# Patient Record
Sex: FEMALE | Race: AMERICAN INDIAN OR ALASKA NATIVE
[De-identification: names, ages, dates, MRNs, and addresses within clinical notes are randomized per-mention and may not be internally consistent; named-entity substitution may affect disease eponyms.]

---

## 2019-01-15 ENCOUNTER — HOSPITAL ENCOUNTER (EMERGENCY)
Dept: HOSPITAL 42 - ED | Age: 44
Discharge: HOME | End: 2019-01-15
Payer: COMMERCIAL

## 2019-01-15 VITALS — BODY MASS INDEX: 38 KG/M2

## 2019-01-15 VITALS
OXYGEN SATURATION: 100 % | RESPIRATION RATE: 18 BRPM | DIASTOLIC BLOOD PRESSURE: 81 MMHG | HEART RATE: 67 BPM | SYSTOLIC BLOOD PRESSURE: 135 MMHG | TEMPERATURE: 98.3 F

## 2019-01-15 DIAGNOSIS — K02.9: Primary | ICD-10-CM

## 2019-01-15 NOTE — ED PDOC
Arrival/HPI





- General


Chief Complaint: Dental Pain


Historian: Patient





- History of Present Illness


Narrative History of Present Illness (Text): 





01/15/19 12:04


44 y/o female, pmh including anemia, nkda, c/o toothache x 1 week with no fall 

or trauma.  Aching  pain, aggravated by biting and chewing, no fever or chills, 

no facial swelling, no night sweat, no dizziness, no other medical or 

psychological complaints. 





Past Medical History





- Provider Review


Nursing Documentation Reviewed: Yes





- Infectious Disease


Hx of Infectious Diseases: None





- Cardiac


Hx Cardiac Disorders: No





- Pulmonary


Hx Respiratory Disorders: No





- Neurological


Hx Neurological Disorder: No





- HEENT


Hx HEENT Disorder: No





- Renal


Hx Renal Disorder: No





- Endocrine/Metabolic


Hx Endocrine Disorders: No





- Hematological/Oncological


Hx Blood Disorders: Yes


Hx Anemia: Yes (iron-deficiency)





- Integumentary


Hx Dermatological Disorder: No





- Musculoskeletal/Rheumatological


Hx Musculoskeletal Disorders: No





- Gastrointestinal


Hx Gastrointestinal Disorders: No





- Genitourinary/Gynecological


Hx Genitourinary Disorders: Yes


Other/Comment: fibroids, miscarriage x 2





- Psychiatric


Hx Psychophysiologic Disorder: No


Hx Substance Use: No





- Surgical History


Hx  Section: Yes


Other/Comment: fibroid removal





- Anesthesia


Hx Anesthesia: Yes


Hx Anesthesia Reactions: No


Hx Malignant Hyperthermia: No





Family/Social History





- Physician Review


Nursing Documentation Reviewed: Yes


Family/Social History: Unknown Family HX


Smoking Status: Never Smoked


Hx Alcohol Use: No


Hx Substance Use: No





Allergies/Home Meds


Allergies/Adverse Reactions: 


Allergies





No Known Allergies Allergy (Verified 01/15/19 12:00)


   











Review of Systems





- Review of Systems


Constitutional: absent: Fatigue, Fevers


Eyes: absent: Vision Changes


ENT: Other (+dental pain).  absent: Hearing Changes


Respiratory: absent: SOB, Cough


Cardiovascular: absent: Chest Pain


Gastrointestinal: absent: Abdominal Pain, Diarrhea, Nausea, Vomiting


Musculoskeletal: absent: Arthralgias, Back Pain


Skin: absent: Rash, Pruritis


Neurological: absent: Headache, Dizziness


Psychiatric: absent: Anxiety, Depression





Physical Exam


Vital Signs Reviewed: Yes





Vital Signs











  Temp Pulse Resp BP Pulse Ox


 


 01/15/19 11:56  98.3 F  67  18  135/81  100











Temperature: Afebrile


Blood Pressure: Normal


Pulse: Regular


Respiratory Rate: Normal


Appearance: Positive for: Well-Appearing, Non-Toxic, Comfortable


Pain Distress: Moderate


Mental Status: Positive for: Alert and Oriented X 3





- Systems Exam


Head: Present: Atraumatic, Normocephalic


Pupils: Present: PERRL


Extroacular Muscles: Present: EOMI


Conjunctiva: Present: Normal


Ears: Present: NORMAL TM, Normal Canal, Other.  No: Erythema


Mouth: Present: Moist Mucous Membranes, Other (visible brokened lt. upper and 

rt. upper molar region with dental caries, no gingivitis or gingival abscess. )


Pharnyx: Present: Normal.  No: ERYTHEMA, EXUDATE, TONSILS ENLARGED, Peritonsilar

Swelling


Nose (External): Present: Atraumatic.  No: Abrasion, Contusion, Laceration


Nose (Internal): Present: Normal Inspection, No Active Bleeding.  No: 

Rhinorrhea, Septal Hematoma, Epistaxis


Neck: Present: Normal Range of Motion, Trachea Midline.  No: Meningeal Signs, 

MIDLINE TENDERNESS, Paraspinal Tenderness, Lymphadenopathy


Respiratory/Chest: Present: Clear to Auscultation, Good Air Exchange.  No: 

Respiratory Distress, Accessory Muscle Use


Cardiovascular: Present: Regular Rate and Rhythm, Normal S1, S2.  No: Murmurs


Abdomen: No: Tenderness, Distention, Peritoneal Signs, Rebound, Guarding


Back: Present: Normal Inspection


Upper Extremity: Present: Normal Inspection.  No: Cyanosis, Edema


Lower Extremity: Present: Normal Inspection.  No: Edema


Neurological: Present: GCS=15, CN II-XII Intact, Speech Normal, Motor Func 

Grossly Intact, Gait Normal, Memory Normal


Skin: Present: Warm, Dry, Normal Color.  No: Rashes


Psychiatric: Present: Alert, Oriented x 3, Normal Insight, Normal Concentration





Medical Decision Making


ED Course and Treatment: 





01/15/19 12:13


-motrin and percocet with amoxicillin


-Urine hcg is negative.


-Observe and reassess





01/15/19 12:27


-Pt. request to be discharged home, stable for outpatient follow up with 

dentist. 


-Discharge home with amoxicillin, duexis, salt water gargling, soft food diet, 

follow up with your own pmd and dentist within 2 days, return to the ER for any 

new or worsening signs or symptoms. 





- PA / NP / Resident Statement


MD/ has reviewed & agrees with the documentation as recorded.





Disposition/Present on Arrival





- Present on Arrival


Any Indicators Present on Arrival: No


History of DVT/PE: No


History of Uncontrolled Diabetes: No


Urinary Catheter: No


History of Decub. Ulcer: No


History Surgical Site Infection Following: None





- Disposition


Have Diagnosis and Disposition been Completed?: Yes


Diagnosis: 


 Dental caries





Disposition: HOME/ ROUTINE


Disposition Time: 12:28


Patient Plan: Discharge


Condition: GOOD


Additional Instructions: 


-Discharge home with amoxicillin, duexis, salt water gargling, soft food diet, 

follow up with your own pmd and dentist within 2 days, return to the ER for any 

new or worsening signs or symptoms. 


Prescriptions: 


Amoxicillin 500 mg PO TID #30 tab


Ibuprofen/Famotidine [Duexis 26.6 mg-800 mg] 1 tab PO TID PRN #21 tab


 PRN Reason: Other


Referrals: 


[facility] [place] up with primary


Forms:  [facility] (English), WORK NOTE